# Patient Record
Sex: FEMALE | Race: WHITE | NOT HISPANIC OR LATINO | Employment: FULL TIME | URBAN - METROPOLITAN AREA
[De-identification: names, ages, dates, MRNs, and addresses within clinical notes are randomized per-mention and may not be internally consistent; named-entity substitution may affect disease eponyms.]

---

## 2023-10-31 ENCOUNTER — APPOINTMENT (OUTPATIENT)
Dept: GENERAL RADIOLOGY | Facility: HOSPITAL | Age: 28
End: 2023-10-31
Payer: COMMERCIAL

## 2023-10-31 ENCOUNTER — HOSPITAL ENCOUNTER (EMERGENCY)
Facility: HOSPITAL | Age: 28
Discharge: HOME | End: 2023-10-31
Attending: EMERGENCY MEDICINE | Admitting: EMERGENCY MEDICINE
Payer: COMMERCIAL

## 2023-10-31 VITALS
SYSTOLIC BLOOD PRESSURE: 163 MMHG | WEIGHT: 293 LBS | HEART RATE: 107 BPM | TEMPERATURE: 97.8 F | OXYGEN SATURATION: 95 % | RESPIRATION RATE: 18 BRPM | DIASTOLIC BLOOD PRESSURE: 102 MMHG | HEIGHT: 65 IN | BODY MASS INDEX: 48.82 KG/M2

## 2023-10-31 DIAGNOSIS — R03.0 ELEVATED BLOOD PRESSURE READING: ICD-10-CM

## 2023-10-31 DIAGNOSIS — M94.0 COSTOCHONDRITIS: Primary | ICD-10-CM

## 2023-10-31 DIAGNOSIS — S39.012A STRAIN OF LUMBAR REGION, INITIAL ENCOUNTER: ICD-10-CM

## 2023-10-31 LAB
ALBUMIN SERPL-MCNC: 3.7 G/DL (ref 3.5–5.2)
ALBUMIN/GLOB SERPL: 1.1 G/DL
ALP SERPL-CCNC: 70 U/L (ref 39–117)
ALT SERPL W P-5'-P-CCNC: 22 U/L (ref 1–33)
ANION GAP SERPL CALCULATED.3IONS-SCNC: 7 MMOL/L (ref 5–15)
AST SERPL-CCNC: 13 U/L (ref 1–32)
BASOPHILS # BLD AUTO: 0.03 10*3/MM3 (ref 0–0.2)
BASOPHILS NFR BLD AUTO: 0.3 % (ref 0–1.5)
BILIRUB SERPL-MCNC: 0.3 MG/DL (ref 0–1.2)
BUN SERPL-MCNC: 15 MG/DL (ref 6–20)
BUN/CREAT SERPL: 21.4 (ref 7–25)
CALCIUM SPEC-SCNC: 8.8 MG/DL (ref 8.6–10.5)
CHLORIDE SERPL-SCNC: 105 MMOL/L (ref 98–107)
CO2 SERPL-SCNC: 25 MMOL/L (ref 22–29)
CREAT SERPL-MCNC: 0.7 MG/DL (ref 0.57–1)
D DIMER PPP FEU-MCNC: <0.27 MCGFEU/ML (ref 0–0.5)
DEPRECATED RDW RBC AUTO: 45.7 FL (ref 37–54)
EGFRCR SERPLBLD CKD-EPI 2021: 122.5 ML/MIN/1.73
EOSINOPHIL # BLD AUTO: 0.2 10*3/MM3 (ref 0–0.4)
EOSINOPHIL NFR BLD AUTO: 2 % (ref 0.3–6.2)
ERYTHROCYTE [DISTWIDTH] IN BLOOD BY AUTOMATED COUNT: 14.4 % (ref 12.3–15.4)
GEN 5 2HR TROPONIN T REFLEX: 7 NG/L
GLOBULIN UR ELPH-MCNC: 3.4 GM/DL
GLUCOSE SERPL-MCNC: 83 MG/DL (ref 65–99)
HCT VFR BLD AUTO: 43.9 % (ref 34–46.6)
HGB BLD-MCNC: 13.3 G/DL (ref 12–15.9)
HOLD SPECIMEN: NORMAL
IMM GRANULOCYTES # BLD AUTO: 0.03 10*3/MM3 (ref 0–0.05)
IMM GRANULOCYTES NFR BLD AUTO: 0.3 % (ref 0–0.5)
LIPASE SERPL-CCNC: 22 U/L (ref 13–60)
LYMPHOCYTES # BLD AUTO: 2.05 10*3/MM3 (ref 0.7–3.1)
LYMPHOCYTES NFR BLD AUTO: 21 % (ref 19.6–45.3)
MCH RBC QN AUTO: 26.4 PG (ref 26.6–33)
MCHC RBC AUTO-ENTMCNC: 30.3 G/DL (ref 31.5–35.7)
MCV RBC AUTO: 87.1 FL (ref 79–97)
MONOCYTES # BLD AUTO: 0.63 10*3/MM3 (ref 0.1–0.9)
MONOCYTES NFR BLD AUTO: 6.4 % (ref 5–12)
NEUTROPHILS NFR BLD AUTO: 6.84 10*3/MM3 (ref 1.7–7)
NEUTROPHILS NFR BLD AUTO: 70 % (ref 42.7–76)
NRBC BLD AUTO-RTO: 0 /100 WBC (ref 0–0.2)
NT-PROBNP SERPL-MCNC: 175.3 PG/ML (ref 0–450)
PLATELET # BLD AUTO: 306 10*3/MM3 (ref 140–450)
PMV BLD AUTO: 10.2 FL (ref 6–12)
POTASSIUM SERPL-SCNC: 4.3 MMOL/L (ref 3.5–5.2)
PROT SERPL-MCNC: 7.1 G/DL (ref 6–8.5)
RBC # BLD AUTO: 5.04 10*6/MM3 (ref 3.77–5.28)
SODIUM SERPL-SCNC: 137 MMOL/L (ref 136–145)
TROPONIN T DELTA: NORMAL
TROPONIN T SERPL HS-MCNC: <6 NG/L
WBC NRBC COR # BLD: 9.78 10*3/MM3 (ref 3.4–10.8)
WHOLE BLOOD HOLD COAG: NORMAL
WHOLE BLOOD HOLD SPECIMEN: NORMAL

## 2023-10-31 PROCEDURE — 71045 X-RAY EXAM CHEST 1 VIEW: CPT

## 2023-10-31 PROCEDURE — 83880 ASSAY OF NATRIURETIC PEPTIDE: CPT | Performed by: EMERGENCY MEDICINE

## 2023-10-31 PROCEDURE — 85379 FIBRIN DEGRADATION QUANT: CPT | Performed by: EMERGENCY MEDICINE

## 2023-10-31 PROCEDURE — 83690 ASSAY OF LIPASE: CPT | Performed by: EMERGENCY MEDICINE

## 2023-10-31 PROCEDURE — 36415 COLL VENOUS BLD VENIPUNCTURE: CPT

## 2023-10-31 PROCEDURE — 84484 ASSAY OF TROPONIN QUANT: CPT | Performed by: EMERGENCY MEDICINE

## 2023-10-31 PROCEDURE — 93005 ELECTROCARDIOGRAM TRACING: CPT

## 2023-10-31 PROCEDURE — 93005 ELECTROCARDIOGRAM TRACING: CPT | Performed by: EMERGENCY MEDICINE

## 2023-10-31 PROCEDURE — 80053 COMPREHEN METABOLIC PANEL: CPT | Performed by: EMERGENCY MEDICINE

## 2023-10-31 PROCEDURE — 99284 EMERGENCY DEPT VISIT MOD MDM: CPT

## 2023-10-31 PROCEDURE — 85025 COMPLETE CBC W/AUTO DIFF WBC: CPT | Performed by: EMERGENCY MEDICINE

## 2023-10-31 RX ORDER — CYCLOBENZAPRINE HCL 10 MG
10 TABLET ORAL ONCE
Status: COMPLETED | OUTPATIENT
Start: 2023-10-31 | End: 2023-10-31

## 2023-10-31 RX ORDER — SODIUM CHLORIDE 0.9 % (FLUSH) 0.9 %
10 SYRINGE (ML) INJECTION AS NEEDED
Status: DISCONTINUED | OUTPATIENT
Start: 2023-10-31 | End: 2023-10-31 | Stop reason: HOSPADM

## 2023-10-31 RX ORDER — LISINOPRIL 10 MG/1
20 TABLET ORAL ONCE
Status: COMPLETED | OUTPATIENT
Start: 2023-10-31 | End: 2023-10-31

## 2023-10-31 RX ORDER — CYCLOBENZAPRINE HCL 10 MG
10 TABLET ORAL 3 TIMES DAILY PRN
Qty: 15 TABLET | Refills: 0 | Status: SHIPPED | OUTPATIENT
Start: 2023-10-31

## 2023-10-31 RX ORDER — ASPIRIN 81 MG/1
324 TABLET, CHEWABLE ORAL ONCE
Status: COMPLETED | OUTPATIENT
Start: 2023-10-31 | End: 2023-10-31

## 2023-10-31 RX ADMIN — CYCLOBENZAPRINE 10 MG: 10 TABLET, FILM COATED ORAL at 16:56

## 2023-10-31 RX ADMIN — ASPIRIN 81 MG CHEWABLE TABLET 324 MG: 81 TABLET CHEWABLE at 16:55

## 2023-10-31 RX ADMIN — LISINOPRIL 20 MG: 10 TABLET ORAL at 16:55

## 2023-10-31 NOTE — Clinical Note
[facility] EMERGENCY DEPARTMENT  [address]  [facility] [address]  Phone: [phone]    Carmel Edouard was seen and treated in our emergency department on 10/31/2023.  She may return to work on 11/02/2023.         Thank you for choosing [facility].    Martina Verdugo RN

## 2023-10-31 NOTE — ED PROVIDER NOTES
EMERGENCY DEPARTMENT ENCOUNTER    Pt Name: Carmel Edouard  MRN: [id number]  Pt :   1995  Room Number:  1616  Date of encounter:  10/31/2023  PCP: Provider, No Known  ED Provider: Wilmer Clark MD    Historian: Patient and her mother      HPI:  Chief Complaint: Chest pain          Context: Carmel Edouard is a 28 y.o. female who presents to the ED c/o substernal chest pain nonradiating, achy sensation, ongoing for the last 2 days.  She noticed the discomfort while at work.  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  Patient notes discomfort in her chest with palpation and movement.  The patient also complains of low back pain which worsens with bending forward and twisting to the side.  This is a discomfort that she has had in the past.  The patient went to begin hospital urgent care.  Patient's mother does not feel she received medical care/evaluation.  Patient did take a Xanax without any relief of her symptoms.  The patient reports no history of GERD.  She does report chronic low back pain.  She has been referred to cardiology on an outpatient basis.  Patient is prescribed lisinopril but has not had any in the last few months.  She states that there is a problem with the prescription ubiquitous.  This recommends 20 mg twice a day.  She has worked this out of the PCP and prescriptions waiting for her at her pharmacy.      PAST MEDICAL HISTORY  No past medical history on file.      PAST SURGICAL HISTORY  No past surgical history on file.      FAMILY HISTORY  No family history on file.      SOCIAL HISTORY  Social History     Socioeconomic History    Marital status: Single         ALLERGIES  Ct contrast, Shellfish-derived products, and Latex        REVIEW OF SYSTEMS  Review of Systems       All systems reviewed and negative except for those discussed in HPI.       PHYSICAL EXAM    I have reviewed the triage  vital signs and nursing notes.    ED Triage Vitals [10/31/23 1547]   Temp Heart Rate Resp BP SpO2   97.8 °F (36.6 °C) 107 18 (!) 163/102 95 %      Temp src Heart Rate Source Patient Position BP Location FiO2 (%)   Oral Monitor Sitting Left arm --       Physical Exam  GENERAL:   Appears in no acute distress.  Initially evaluate her in our triage area as we have no beds for immediate rooming and then I can examine her in our patient care room.  HENT: Nares patent.  EYES: No scleral icterus.  CV: Regular rhythm, regular rate.  No murmurs gallops rubs  RESPIRATORY: Normal effort.  No audible wheezes, rales or rhonchi.  Clear to auscultation  ABDOMEN: Soft, nontender, protuberant  MUSCULOSKELETAL: No deformities.  Reproducible costochondral joint tenderness at the right sternal border greater than left sternal border.  Reproducible paraspinal muscular tenderness to palpation patient patient is  NEURO: Alert, moves all extremities, follows commands.  SKIN: Warm, dry, no rash visualized.      LAB RESULTS  No results found for this or any previous visit (from the past 24 hour(s)).      If labs were ordered, I independently reviewed the results and considered them in treating the patient.        RADIOLOGY  No Radiology Exams Resulted Within Past 24 Hours    I ordered and independently reviewed the above noted radiographic studies.      I viewed images of chest x-ray which showed no active disease per my independent interpretation.    See radiologist's dictation for official interpretation.        PROCEDURES    Procedures    ECG 12 Lead ED Triage Standing Order; Chest Pain   Final Result   Test Reason : ED Triage Standing Order~   Blood Pressure :   */*   mmHG   Vent. Rate :  96 BPM     Atrial Rate :  96 BPM      P-R Int : 138 ms          QRS Dur :  82 ms       QT Int : 346 ms       P-R-T Axes :  38  57  23 degrees      QTc Int : 437 ms      Normal sinus rhythm   Normal ECG   No previous ECGs available   Confirmed by TOM   MD, LUANN ([id number]) on 11/1/2023 1:34:19 AM      Referred By: TAVARES           Confirmed By: LUANN SANTOS MD          MEDICATIONS GIVEN IN ER    Medications   aspirin chewable tablet 324 mg (324 mg Oral Given 10/31/23 1655)   lisinopril (PRINIVIL,ZESTRIL) tablet 20 mg (20 mg Oral Given 10/31/23 1655)   cyclobenzaprine (FLEXERIL) tablet 10 mg (10 mg Oral Given 10/31/23 1656)         MEDICAL DECISION MAKING, PROGRESS, and CONSULTS    All labs, if obtained, have been independently reviewed by me.  All radiology studies, if obtained, have been reviewed by me and the radiologist dictating the report.  All EKG's, if obtained, have been independently viewed and interpreted by me/my attending physician.      Discussion below represents my analysis of pertinent findings related to patient's condition, differential diagnosis, treatment plan and final disposition.                         Differential diagnosis:    Costochondritis versus acute coronary syndrome particularly embolism.  Low back pain is quite consistent with lumbar strain.  Such as AAA or consider that.      Additional sources:    - Discussed/ obtained information from independent historians: Patient's mother was very forthcoming with patient's history.  I spoke with her triage as well as in    - External (non-ED) record review: I queried the medical record representative  The whole information.    - Chronic or social conditions impacting care: Obesity BMI 66.6.    - Shared decision making: Patient is ambulatory with current plan for evaluation and treatment      Orders placed during this visit:  Orders Placed This Encounter   Procedures    XR Chest 1 View    Desmet Draw    High Sensitivity Troponin T    Comprehensive Metabolic Panel    Lipase    BNP    CBC Auto Differential    D-dimer, Quantitative    High Sensitivity Troponin T 2Hr    CBC & Differential    Green Top (Gel)    Lavender Top    Gold Top - SST    Gray Top    Light Blue Top         Additional orders  considered but not ordered:  CTA chest    ED Course:    Consultants:                  Shared Decision Making:  After my consideration of clinical presentation and any laboratory/radiology studies obtained, I discussed the findings with the patient/patient representative who is in agreement with the treatment plan and the final disposition.   Risks and benefits of discharge and/or observation/admission were discussed.       AS OF 20:20 EDT VITALS:    BP - (!) 163/102  HR - 107  TEMP - 97.8 °F (36.6 °C) (Oral)  O2 SATS - 95%                  DIAGNOSIS  Final diagnoses:   Costochondritis   Strain of lumbar region, initial encounter   Elevated blood pressure reading         DISPOSITION  DISCHARGE    Patient discharged in stable condition.    Reviewed implications of results, diagnosis, meds, responsibility to follow up, warning signs and symptoms of possible worsening, potential complications and reasons to return to ER.    Patient/Family voiced understanding of above instructions.    Discussed plan for discharge, as there is no emergent indication for admission.  Pt/family is agreeable and understands need for follow up and possible repeat testing.  Pt/family is aware that discharge does not mean that nothing is wrong but that it indicates no emergency is currently present that requires admission and they must continue care with follow-up as given below or with a physician of their choice.     FOLLOW-UP  PATIENT CONNECTION - [facility] [id number]  [phone]    NEXT AVAILABLE APPOINTMENT - RECHECK OF CONDITION    [facility] EMERGENCY DEPARTMENT  [address]  [facility] [id number] [phone]    IF YOU HAVE ANY CONCERN OF WORSENING CONDITION         Medication List        New Prescriptions      cyclobenzaprine 10 MG tablet  Commonly known as: FLEXERIL  Take 1 tablet by mouth 3 (Three) Times a Day As Needed for Muscle Spasms.               Where to Get Your Medications         These medications were sent to [facility] - [place], [place] - [address] - [phone]  - [phone]   [address], [facility] [address]      Phone: [phone]   cyclobenzaprine 10 MG tablet             Please note that portions of this document were completed with voice recognition software.        Wilmer Clark MD  11/03/23 2020

## 2023-10-31 NOTE — Clinical Note
[facility] EMERGENCY DEPARTMENT  [address]  [facility] [address]  Phone: [phone]    Carmel Edouard was seen and treated in our emergency department on 10/31/2023.  She may return to work on 11/03/2023.         Thank you for choosing [facility].    Wilmer Clark MD

## 2023-10-31 NOTE — DISCHARGE INSTRUCTIONS
LIGHT DUTY UNTIL FOLLOW UP.    Please check your blood pressure 3 times a day.   Keep a chart of these readings and any correlation to symptoms you might be having at the time.  Bring this chart to the follow up with your primary care physician.   If you don't already have a good (upper arm, not wrist) blood pressure cuff, I recommend asking your pharmacist or purchasing a highly rated one off of [employer].

## 2023-11-01 LAB
QT INTERVAL: 346 MS
QTC INTERVAL: 437 MS

## 2023-12-19 ENCOUNTER — OFFICE VISIT (OUTPATIENT)
Dept: CARDIOLOGY | Facility: CLINIC | Age: 28
End: 2023-12-19
Payer: COMMERCIAL

## 2023-12-19 VITALS
OXYGEN SATURATION: 94 % | SYSTOLIC BLOOD PRESSURE: 151 MMHG | DIASTOLIC BLOOD PRESSURE: 87 MMHG | HEIGHT: 65 IN | WEIGHT: 293 LBS | BODY MASS INDEX: 48.82 KG/M2 | HEART RATE: 116 BPM

## 2023-12-19 DIAGNOSIS — I10 PRIMARY HYPERTENSION: ICD-10-CM

## 2023-12-19 DIAGNOSIS — R06.02 SHORTNESS OF BREATH: ICD-10-CM

## 2023-12-19 DIAGNOSIS — R07.9 CHEST PAIN, UNSPECIFIED TYPE: Primary | ICD-10-CM

## 2023-12-19 PROCEDURE — 99204 OFFICE O/P NEW MOD 45 MIN: CPT | Performed by: PHYSICIAN ASSISTANT

## 2023-12-19 RX ORDER — LOSARTAN POTASSIUM 25 MG/1
25 TABLET ORAL DAILY
COMMUNITY
End: 2023-12-19 | Stop reason: SDUPTHER

## 2023-12-19 RX ORDER — LOSARTAN POTASSIUM 100 MG/1
100 TABLET ORAL DAILY
Qty: 30 TABLET | Refills: 5 | Status: SHIPPED | OUTPATIENT
Start: 2023-12-19

## 2023-12-19 RX ORDER — ALBUTEROL SULFATE 90 UG/1
AEROSOL, METERED RESPIRATORY (INHALATION) DAILY PRN
COMMUNITY

## 2023-12-19 RX ORDER — MONTELUKAST SODIUM 10 MG/1
10 TABLET ORAL NIGHTLY
COMMUNITY

## 2023-12-19 RX ORDER — METOPROLOL SUCCINATE 25 MG/1
25 TABLET, EXTENDED RELEASE ORAL DAILY
Qty: 30 TABLET | Refills: 11 | Status: SHIPPED | OUTPATIENT
Start: 2023-12-19

## 2023-12-19 NOTE — PROGRESS NOTES
Problem list     Subjective   Carmel Edouard is a 28 y.o. female     Chief Complaint   Patient presents with    Establish Care    Chest Pain    Hyperlipidemia    Hypertension       HPI    Patient is a 28-year-old female who presents She will feel this pressure in the substernal region.  She has a degree of dyspnea at baseline that is mild but does not describe any progressive shortness of breath.  No PND or orthopnea.    Chest x-ray was unremarkable.    She describes a constant discomfort near this area.  She was noted to be hypertensive in the ER and is hypertensive today in the office.  She underwent evaluation to exclude MI as well as PE which was negative.  to the office to be evaluated.  She has no history of congenital heart disease or structural heart disease.    Recently, she was at work when she started getting retrosternal chest discomfort as a pressure.  She went to the ER.  She does palpitate at times or feel her heart rate beating fast at times.  This is mild.  She does not describe any dizziness, presyncope, or syncope.  Patient is stable otherwise.      Current Outpatient Medications on File Prior to Visit   Medication Sig Dispense Refill    albuterol sulfate  (90 Base) MCG/ACT inhaler Daily As Needed.      montelukast (SINGULAIR) 10 MG tablet Take 1 tablet by mouth Every Night.      [DISCONTINUED] losartan (COZAAR) 25 MG tablet Take 1 tablet by mouth Daily.      [DISCONTINUED] cyclobenzaprine (FLEXERIL) 10 MG tablet Take 1 tablet by mouth 3 (Three) Times a Day As Needed for Muscle Spasms. 15 tablet 0     No current facility-administered medications on file prior to visit.       Ct contrast, Shellfish-derived products, Tape, and Latex    Past Medical History:   Diagnosis Date    Asthma     Hypertension        Social History     Socioeconomic History    Marital status: Single   Tobacco Use    Smoking status: Never    Smokeless tobacco: Never   Substance and Sexual Activity    Alcohol use: Yes  "   Drug use: Never    Sexual activity: Defer       Family History   Problem Relation Age of Onset    Heart failure Maternal Grandfather     Heart attack Paternal Grandfather        Review of Systems   Constitutional:  Positive for fatigue. Negative for activity change, appetite change, chills and fever.   Eyes: Negative.  Negative for visual disturbance.   Respiratory:  Positive for apnea and shortness of breath. Negative for cough, chest tightness and wheezing.    Cardiovascular:  Positive for chest pain and palpitations. Negative for leg swelling.   Gastrointestinal: Negative.  Negative for blood in stool.   Endocrine: Negative.  Negative for cold intolerance and heat intolerance.   Genitourinary: Negative.  Negative for hematuria.   Musculoskeletal: Negative.    Skin: Negative.  Negative for color change, rash and wound.   Allergic/Immunologic: Negative.  Negative for environmental allergies and food allergies.   Neurological:  Positive for headaches. Negative for dizziness, syncope, weakness, light-headedness and numbness.   Hematological:  Does not bruise/bleed easily.   Psychiatric/Behavioral: Negative.  Negative for sleep disturbance.        Objective   Vitals:    12/19/23 1453   BP: 151/87   BP Location: Left arm   Patient Position: Sitting   Cuff Size: Adult   Pulse: 116   SpO2: 94%   Weight: (!) 191 kg (422 lb)   Height: 165.1 cm (65\")      /87 (BP Location: Left arm, Patient Position: Sitting, Cuff Size: Adult)   Pulse 116   Ht 165.1 cm (65\")   Wt (!) 191 kg (422 lb)   SpO2 94%   BMI 70.22 kg/m²     Lab Results (most recent)       None            Physical Exam  Vitals and nursing note reviewed.   Constitutional:       General: She is not in acute distress.     Appearance: Normal appearance. She is well-developed.   HENT:      Head: Normocephalic and atraumatic.   Eyes:      General: No scleral icterus.        Right eye: No discharge.         Left eye: No discharge.      Conjunctiva/sclera: " Conjunctivae normal.   Neck:      Vascular: No carotid bruit.   Cardiovascular:      Rate and Rhythm: Normal rate and regular rhythm.      Heart sounds: Normal heart sounds. No murmur heard.     No friction rub. No gallop.   Pulmonary:      Effort: Pulmonary effort is normal. No respiratory distress.      Breath sounds: Normal breath sounds. No wheezing or rales.   Chest:      Chest wall: No tenderness.   Musculoskeletal:      Right lower leg: No edema.      Left lower leg: No edema.   Skin:     General: Skin is warm and dry.      Coloration: Skin is not pale.      Findings: No erythema or rash.   Neurological:      Mental Status: She is alert and oriented to person, place, and time.      Cranial Nerves: No cranial nerve deficit.   Psychiatric:         Behavior: Behavior normal.         Procedure   Procedures       Assessment & Plan     Problems Addressed this Visit          Cardiac and Vasculature    Chest pain - Primary    Relevant Orders    Adult Transthoracic Echo Complete W/ Cont if Necessary Per Protocol    Treadmill Stress Test    Primary hypertension    Relevant Medications    losartan (COZAAR) 100 MG tablet    metoprolol succinate XL (TOPROL-XL) 25 MG 24 hr tablet    Other Relevant Orders    Adult Transthoracic Echo Complete W/ Cont if Necessary Per Protocol    Treadmill Stress Test       Pulmonary and Pneumonias    Shortness of breath    Relevant Orders    Adult Transthoracic Echo Complete W/ Cont if Necessary Per Protocol    Treadmill Stress Test     Diagnoses         Codes Comments    Chest pain, unspecified type    -  Primary ICD-10-CM: R07.9  ICD-9-CM: 786.50     Shortness of breath     ICD-10-CM: R06.02  ICD-9-CM: 786.05     Primary hypertension     ICD-10-CM: I10  ICD-9-CM: 401.9             Recommendation  1.  Patient noted to be hypertensive in the ER as well as today.  Much of her symptoms could be related to her blood pressure.  I am increasing losartan to 100 mg and adding low-dose beta-blocker  therapy which I  feel will help with her chest pain and baseline tachycardia.  I want her to call back in 1 to 2 weeks with blood pressure readings as we can make adjustments telephonically if needed.    2.  Patient with baseline symptoms and we will schedule for testing.  Regular treadmill stress test will be ordered for risk stratification.    3.  Echo to evaluate LV systolic and diastolic performance, valvular structures etc.    4.  Otherwise, we will see patient back for follow-up on testing and recommend further.  Follow-up with primary as scheduled.         Patient did not bring med list or medicine bottles to appointment, med list has been reviewed and updated based on patient's knowledge of their meds.      Electronically signed by:

## 2023-12-19 NOTE — LETTER
December 19, 2023       No Recipients    Patient: Carmel Edouard   YOB: 1995   Date of Visit: 12/19/2023       Dear BRITTANY Yin    Carmel Edouard was in my office today. Below is a copy of my note.    If you have questions, please do not hesitate to call me. I look forward to following Carmel along with you.         Sincerely,        NAMITA Foster        CC:   No Recipients    Problem list     Subjective  Carmel Edouard is a 28 y.o. female     Chief Complaint   Patient presents with   • Establish Care   • Chest Pain   • Hyperlipidemia   • Hypertension       HPI    Patient is a 28-year-old female who presents She will feel this pressure in the substernal region.  She has a degree of dyspnea at baseline that is mild but does not describe any progressive shortness of breath.  No PND or orthopnea.    Chest x-ray was unremarkable.    She describes a constant discomfort near this area.  She was noted to be hypertensive in the ER and is hypertensive today in the office.  She underwent evaluation to exclude MI as well as PE which was negative.  to the office to be evaluated.  She has no history of congenital heart disease or structural heart disease.    Recently, she was at work when she started getting retrosternal chest discomfort as a pressure.  She went to the ER.  She does palpitate at times or feel her heart rate beating fast at times.  This is mild.  She does not describe any dizziness, presyncope, or syncope.  Patient is stable otherwise.      Current Outpatient Medications on File Prior to Visit   Medication Sig Dispense Refill   • albuterol sulfate  (90 Base) MCG/ACT inhaler Daily As Needed.     • montelukast (SINGULAIR) 10 MG tablet Take 1 tablet by mouth Every Night.     • [DISCONTINUED] losartan (COZAAR) 25 MG tablet Take 1 tablet by mouth Daily.     • [DISCONTINUED] cyclobenzaprine (FLEXERIL) 10 MG tablet Take 1 tablet by mouth 3 (Three) Times a Day As  "Needed for Muscle Spasms. 15 tablet 0     No current facility-administered medications on file prior to visit.       Ct contrast, Shellfish-derived products, Tape, and Latex    Past Medical History:   Diagnosis Date   • Asthma    • Hypertension        Social History     Socioeconomic History   • Marital status: Single   Tobacco Use   • Smoking status: Never   • Smokeless tobacco: Never   Substance and Sexual Activity   • Alcohol use: Yes   • Drug use: Never   • Sexual activity: Defer       Family History   Problem Relation Age of Onset   • Heart failure Maternal Grandfather    • Heart attack Paternal Grandfather        Review of Systems   Constitutional:  Positive for fatigue. Negative for activity change, appetite change, chills and fever.   Eyes: Negative.  Negative for visual disturbance.   Respiratory:  Positive for apnea and shortness of breath. Negative for cough, chest tightness and wheezing.    Cardiovascular:  Positive for chest pain and palpitations. Negative for leg swelling.   Gastrointestinal: Negative.  Negative for blood in stool.   Endocrine: Negative.  Negative for cold intolerance and heat intolerance.   Genitourinary: Negative.  Negative for hematuria.   Musculoskeletal: Negative.    Skin: Negative.  Negative for color change, rash and wound.   Allergic/Immunologic: Negative.  Negative for environmental allergies and food allergies.   Neurological:  Positive for headaches. Negative for dizziness, syncope, weakness, light-headedness and numbness.   Hematological:  Does not bruise/bleed easily.   Psychiatric/Behavioral: Negative.  Negative for sleep disturbance.        Objective  Vitals:    12/19/23 1453   BP: 151/87   BP Location: Left arm   Patient Position: Sitting   Cuff Size: Adult   Pulse: 116   SpO2: 94%   Weight: (!) 191 kg (422 lb)   Height: 165.1 cm (65\")      /87 (BP Location: Left arm, Patient Position: Sitting, Cuff Size: Adult)   Pulse 116   Ht 165.1 cm (65\")   Wt (!) 191 kg " (422 lb)   SpO2 94%   BMI 70.22 kg/m²     Lab Results (most recent)       None            Physical Exam  Vitals and nursing note reviewed.   Constitutional:       General: She is not in acute distress.     Appearance: Normal appearance. She is well-developed.   HENT:      Head: Normocephalic and atraumatic.   Eyes:      General: No scleral icterus.        Right eye: No discharge.         Left eye: No discharge.      Conjunctiva/sclera: Conjunctivae normal.   Neck:      Vascular: No carotid bruit.   Cardiovascular:      Rate and Rhythm: Normal rate and regular rhythm.      Heart sounds: Normal heart sounds. No murmur heard.     No friction rub. No gallop.   Pulmonary:      Effort: Pulmonary effort is normal. No respiratory distress.      Breath sounds: Normal breath sounds. No wheezing or rales.   Chest:      Chest wall: No tenderness.   Musculoskeletal:      Right lower leg: No edema.      Left lower leg: No edema.   Skin:     General: Skin is warm and dry.      Coloration: Skin is not pale.      Findings: No erythema or rash.   Neurological:      Mental Status: She is alert and oriented to person, place, and time.      Cranial Nerves: No cranial nerve deficit.   Psychiatric:         Behavior: Behavior normal.         Procedure  Procedures       Assessment & Plan    Problems Addressed this Visit          Cardiac and Vasculature    Chest pain - Primary    Relevant Orders    Adult Transthoracic Echo Complete W/ Cont if Necessary Per Protocol    Treadmill Stress Test    Primary hypertension    Relevant Medications    losartan (COZAAR) 100 MG tablet    metoprolol succinate XL (TOPROL-XL) 25 MG 24 hr tablet    Other Relevant Orders    Adult Transthoracic Echo Complete W/ Cont if Necessary Per Protocol    Treadmill Stress Test       Pulmonary and Pneumonias    Shortness of breath    Relevant Orders    Adult Transthoracic Echo Complete W/ Cont if Necessary Per Protocol    Treadmill Stress Test     Diagnoses         Codes  Comments    Chest pain, unspecified type    -  Primary ICD-10-CM: R07.9  ICD-9-CM: 786.50     Shortness of breath     ICD-10-CM: R06.02  ICD-9-CM: 786.05     Primary hypertension     ICD-10-CM: I10  ICD-9-CM: 401.9             Recommendation  1.  Patient noted to be hypertensive in the ER as well as today.  Much of her symptoms could be related to her blood pressure.  I am increasing losartan to 100 mg and adding low-dose beta-blocker therapy which I  feel will help with her chest pain and baseline tachycardia.  I want her to call back in 1 to 2 weeks with blood pressure readings as we can make adjustments telephonically if needed.    2.  Patient with baseline symptoms and we will schedule for testing.  Regular treadmill stress test will be ordered for risk stratification.    3.  Echo to evaluate LV systolic and diastolic performance, valvular structures etc.    4.  Otherwise, we will see patient back for follow-up on testing and recommend further.  Follow-up with primary as scheduled.         Patient did not bring med list or medicine bottles to appointment, med list has been reviewed and updated based on patient's knowledge of their meds.      Electronically signed by:

## 2024-05-01 ENCOUNTER — TELEPHONE (OUTPATIENT)
Dept: CARDIOLOGY | Facility: CLINIC | Age: 29
End: 2024-05-01

## 2024-05-01 NOTE — TELEPHONE ENCOUNTER
Caller: Carmel Edouard    Relationship to patient: Self    Best call back number: [phone]     Type of visit: F/U     Requested date: WHEN STRESS RESULTS ARE BACK, PER CHART THIS IS 2 WEEKS AFTER TESTING (6/3)     If rescheduling, when is the original appointment: 6/5     Additional notes:NEXT AVAILABLE ISN'T UNTIL JULY 24 TH

## 2024-05-29 ENCOUNTER — TELEPHONE (OUTPATIENT)
Dept: CARDIOLOGY | Facility: CLINIC | Age: 29
End: 2024-05-29

## 2024-05-29 NOTE — TELEPHONE ENCOUNTER
Alejandra from  financial office called left VM on triage.    She stated she has been trying to reach patient in regards to upcoming testing - patients Insurance on not active.     Patients VM is full / she is not answering- Alejandra stated she has used all her resource trying to reach patient  , she asked if we could help and try to reach patient.     I called patient she answered , as I was telling her who I was an where I was calling from and what I was calling about she hung up on me , I tried to call back with no answer and VM if full.

## 2024-05-29 NOTE — TELEPHONE ENCOUNTER
Called to speak with the pt regarding this.  No answer, mailbox is full and unable to accept any mailboxes at this time.

## 2024-05-31 NOTE — TELEPHONE ENCOUNTER
Alejandra from Financial Assistance called again stating she is needing to know weather she needs to Cancel testing or keep trying to reach patient.       Sending to [facility] manger for recommendation.